# Patient Record
Sex: FEMALE | Race: WHITE | NOT HISPANIC OR LATINO | Employment: FULL TIME
[De-identification: names, ages, dates, MRNs, and addresses within clinical notes are randomized per-mention and may not be internally consistent; named-entity substitution may affect disease eponyms.]

---

## 2021-09-25 ENCOUNTER — HEALTH MAINTENANCE LETTER (OUTPATIENT)
Age: 41
End: 2021-09-25

## 2021-09-29 ENCOUNTER — HOSPITAL ENCOUNTER (OUTPATIENT)
Dept: MAMMOGRAPHY | Facility: CLINIC | Age: 41
Discharge: HOME | End: 2021-09-29
Attending: PHYSICIAN ASSISTANT | Admitting: PHYSICIAN ASSISTANT
Payer: COMMERCIAL

## 2021-09-29 DIAGNOSIS — Z12.31 VISIT FOR SCREENING MAMMOGRAM: ICD-10-CM

## 2021-09-29 PROCEDURE — 77063 BREAST TOMOSYNTHESIS BI: CPT

## 2022-12-26 ENCOUNTER — HEALTH MAINTENANCE LETTER (OUTPATIENT)
Age: 42
End: 2022-12-26

## 2023-03-31 ENCOUNTER — HOSPITAL ENCOUNTER (OUTPATIENT)
Dept: MAMMOGRAPHY | Facility: CLINIC | Age: 43
Discharge: HOME | End: 2023-03-31
Admitting: PHYSICIAN ASSISTANT
Payer: COMMERCIAL

## 2023-03-31 ENCOUNTER — ANCILLARY ORDERS (OUTPATIENT)
Dept: MAMMOGRAPHY | Facility: CLINIC | Age: 43
End: 2023-03-31

## 2023-03-31 DIAGNOSIS — Z12.31 VISIT FOR SCREENING MAMMOGRAM: ICD-10-CM

## 2023-03-31 PROCEDURE — 77067 SCR MAMMO BI INCL CAD: CPT

## 2024-02-04 ENCOUNTER — HEALTH MAINTENANCE LETTER (OUTPATIENT)
Age: 44
End: 2024-02-04

## 2024-11-15 ENCOUNTER — HOSPITAL ENCOUNTER (OUTPATIENT)
Dept: MAMMOGRAPHY | Facility: CLINIC | Age: 44
Discharge: HOME | End: 2024-11-15
Admitting: PHYSICIAN ASSISTANT
Payer: COMMERCIAL

## 2024-11-15 DIAGNOSIS — Z12.31 VISIT FOR SCREENING MAMMOGRAM: ICD-10-CM

## 2024-11-15 PROCEDURE — 77063 BREAST TOMOSYNTHESIS BI: CPT

## 2024-11-20 ENCOUNTER — HOSPITAL ENCOUNTER (OUTPATIENT)
Dept: MAMMOGRAPHY | Facility: CLINIC | Age: 44
Discharge: HOME | End: 2024-11-20
Attending: PHYSICIAN ASSISTANT
Payer: COMMERCIAL

## 2024-11-20 DIAGNOSIS — R92.8 ABNORMAL MAMMOGRAM: ICD-10-CM

## 2024-11-20 PROCEDURE — 77065 DX MAMMO INCL CAD UNI: CPT | Mod: LT

## 2024-11-25 ENCOUNTER — HOSPITAL ENCOUNTER (OUTPATIENT)
Dept: MAMMOGRAPHY | Facility: CLINIC | Age: 44
Discharge: HOME | End: 2024-11-25
Attending: PHYSICIAN ASSISTANT
Payer: COMMERCIAL

## 2024-11-25 DIAGNOSIS — R92.8 ABNORMAL MAMMOGRAM: ICD-10-CM

## 2024-11-25 PROCEDURE — 88360 TUMOR IMMUNOHISTOCHEM/MANUAL: CPT | Mod: TC | Performed by: PHYSICIAN ASSISTANT

## 2024-11-25 PROCEDURE — A4648 IMPLANTABLE TISSUE MARKER: HCPCS

## 2024-11-25 PROCEDURE — 999N000065 MA POST PROCEDURE LEFT

## 2024-11-25 PROCEDURE — 250N000009 HC RX 250: Performed by: RADIOLOGY

## 2024-11-25 PROCEDURE — 250N000011 HC RX IP 250 OP 636: Performed by: RADIOLOGY

## 2024-11-25 PROCEDURE — 272N000715 MA STEREOTACTIC BREAST BIOPSY VACUUM LT

## 2024-11-25 PROCEDURE — 88305 TISSUE EXAM BY PATHOLOGIST: CPT | Mod: TC | Performed by: PHYSICIAN ASSISTANT

## 2024-11-25 RX ADMIN — LIDOCAINE HYDROCHLORIDE 5 ML: 10 INJECTION, SOLUTION INFILTRATION; PERINEURAL at 10:14

## 2024-11-25 RX ADMIN — LIDOCAINE HYDROCHLORIDE 10 ML: 10; .005 INJECTION, SOLUTION EPIDURAL; INFILTRATION; INTRACAUDAL; PERINEURAL at 10:14

## 2024-11-25 NOTE — DISCHARGE INSTRUCTIONS
After Your Breast Biopsy  Bleeding, bruising, and pain  Breast tenderness and some bruising is normal and may last several days. You may wear your bra overnight to support the breast.  You may use an ice pack for pain. Place it over the area for 15 to 20 minutes, several times a day.  You may take over-the-counter pain medicine:  On the day of the biopsy, we recommend Tylenol (acetaminophen) because it does not raise your risk of bleeding.  The next day, you may take an anti-inflammatory medicine (aspirin, ibuprofen, Motrin, Aleve, Advil), unless your doctor tells you not to.  Bandages and showering  Keep your bandage in place until tomorrow morning. Don't get it wet.  If you have small pieces of tape on the skin, leave them in place. They will fall off on their own, or you can remove them after 5 days.  You may shower the next morning after your biopsy.  Activity  No heavy activity (no running, no gym workouts, no lifting, no vacuuming, etc.) on the day of your biopsy.  You may go back to normal activity the next day. But limit what you do if you still have pain or discomfort.  Infection  Infection is rare. Signs of infection include:  Fever (including sweats and chills)  Redness  Pain that gets worse  Fluid draining from the biopsy site  Biopsy results  Results may take up to 5 business days.  A nurse or doctor from the Breast Center will call with your results. We will also send the results to the doctor that ordered your biopsy.  If you have not gotten your results in 5 days, please call the Breast Center.  Call the Breast Center with questions or if:   You have bleeding that lasts more than 20 minutes.  You have pain that you can't control.  You have signs of infection (fever, sweats, chills, redness, increasing pain, or drainage).  After hours, please call the doctor who ordered your biopsy.  For informational purposes only. Not to replace the advice of your health care provider. Copyright   2010 [employer] [facility]. All rights reserved. Clinically reviewed by Stacey Solomon, Director, [facility]. [employer] [id number] - REV 08/23.

## 2024-11-27 LAB
PATH REPORT.COMMENTS IMP SPEC: ABNORMAL
PATH REPORT.COMMENTS IMP SPEC: YES
PATH REPORT.FINAL DX SPEC: ABNORMAL
PATH REPORT.GROSS SPEC: ABNORMAL
PATH REPORT.MICROSCOPIC SPEC OTHER STN: ABNORMAL
PATH REPORT.RELEVANT HX SPEC: ABNORMAL
PATHOLOGY SYNOPTIC REPORT: ABNORMAL
PHOTO IMAGE: ABNORMAL

## 2024-11-27 NOTE — PROGRESS NOTES
Left patient a voicemail following up with her after her recent breast biopsy. Direct number provided. Awaiting return call.     Ania Moore, RN, BSN, PHN, CBCN  Breast Nurse Coordinator  [facility]  [phone]

## 2024-11-27 NOTE — PROGRESS NOTES
"Malignant Path:  Pathology report reviewed with our breast radiologist Dr. Gurpreet Younger, who confirmed the recent breast imaging is concordant with the final surgical pathology results below.    Called and spoke with Misti \"Hermelinda\" regarding the Stereotactic Guided Left Breast Biopsy results showing G3 DCIS ER/ID Negative.     Patient states no problems with biopsy site.    Recommended follow up is Surgical / Oncologic  consultation.    Patient was scheduled at the Multidisciplinary clinic at [facility], on 12/6/2024 with Dr. Hoff [phone] and Dr. Reilly [phone].    Questions were answered and I explained my role as Breast Care Nurse Coordinator in assisting her with appointments, resources and social support.  New diagnosis information packet will be available for patient at surgical consult.  ROSITA Hinojosa will meet with patient the day of her appointments. Patient verbalized understanding and agrees with the plan of care.    Ordering provider- Yamileth Pisano PA-C has been notified of the results, recommendations for follow up, and scheduled surgical consultation.  I will forward this note, along with the pathology results.     Ania Moore RN, BSN, PHN, CBCN  Breast Care Nurse Coordinator  [facility]  [phone]     Final Pathology:  Misti Araujo [id number]  F, 1980  Surgical Pathology Report (Final result) [id number]  Authorizing Provider: Yamileth Pisano PA-C Ordering Provider: Yamileth Pisano PA-C  Ordering Location: [facility]  Collected: 11/25/2024 10:27 AM  Pathologist: Kathe Peck MD Received: 11/25/2024 11:49 AM  .  Specimens  A Breast, Left    Final Diagnosis  A. Left breast with microcalcifications, 10:30, 5 cm from the nipple, 1.5 cm size, intermediate suspicion, stereotactic core  biopsies:  -DUCTAL CARCINOMA IN SITU (DCIS), nuclear grade 3, cribriform type, with calcifications, focal necrosis, and " associated  fibrosis and chronic inflammation (regressive changes).  -No invasive carcinoma identified.  -Size: At least 3 mm in greatest dimension, involving multiple core biopsies.  -Estrogen and Progesterone receptor: Negative (see comment).  -See Breast Biomarker Reporting Template.  Electronically signed by Kathe Peck MD on 11/27/2024 at 12:01 PM

## 2024-12-03 NOTE — TELEPHONE ENCOUNTER
RECORDS STATUS - BREAST    RECORDS REQUESTED FROM: [facility] - Internal records   DATE REQUESTED: 12/3

## 2024-12-06 ENCOUNTER — PRE VISIT (OUTPATIENT)
Dept: ONCOLOGY | Facility: CLINIC | Age: 44
End: 2024-12-06
Payer: COMMERCIAL

## 2024-12-06 ENCOUNTER — OFFICE VISIT (OUTPATIENT)
Dept: ONCOLOGY | Facility: CLINIC | Age: 44
End: 2024-12-06
Attending: PHYSICIAN ASSISTANT
Payer: COMMERCIAL

## 2024-12-06 VITALS
SYSTOLIC BLOOD PRESSURE: 146 MMHG | HEIGHT: 67 IN | DIASTOLIC BLOOD PRESSURE: 99 MMHG | OXYGEN SATURATION: 96 % | HEART RATE: 96 BPM | WEIGHT: 276 LBS | TEMPERATURE: 98.9 F | RESPIRATION RATE: 16 BRPM | BODY MASS INDEX: 43.32 KG/M2

## 2024-12-06 DIAGNOSIS — D05.12 DUCTAL CARCINOMA IN SITU (DCIS) OF LEFT BREAST: Primary | ICD-10-CM

## 2024-12-06 LAB
INTERPRETATION SERPL IEP-IMP: NORMAL
INTERPRETATION SERPL IEP-IMP: NORMAL
LAB PDF RESULT: NORMAL
LAB PDF RESULT: NORMAL
LAB TEST RESULTS REPORTED IN RPTPERIOD: NORMAL
LAB TEST RESULTS REPORTED IN RPTPERIOD: NORMAL
SEQUENCING METHOD PNL BLD/T: NORMAL
SEQUENCING METHOD PNL BLD/T: NORMAL
SPECIMEN TYPE: NORMAL
SPECIMEN TYPE: NORMAL

## 2024-12-06 PROCEDURE — 99205 OFFICE O/P NEW HI 60 MIN: CPT | Performed by: INTERNAL MEDICINE

## 2024-12-06 PROCEDURE — G2211 COMPLEX E/M VISIT ADD ON: HCPCS | Performed by: INTERNAL MEDICINE

## 2024-12-06 ASSESSMENT — PAIN SCALES - GENERAL: PAINLEVEL_OUTOF10: NO PAIN (0)

## 2024-12-06 NOTE — LETTER
"12/6/2024      Misti Araujo  [address]  [address]  [place] [address]      Dear Colleague,    Thank you for referring your patient, Misti Araujo, to the [facility]. Please see a copy of my visit note below.    Oncology Rooming Note    December 6, 2024 8:10 AM   Misti Araujo is a 43 year old female who presents for:    Chief Complaint   Patient presents with     Oncology Clinic Visit     ROOM #1 Complete - AEH  Left breast G3 DCIS ER/IN-        Initial Vitals: BP (!) 146/99 (BP Location: Right leg, Patient Position: Chair, Cuff Size: Adult Large)   Pulse 96   Temp 98.9  F (37.2  C) (Oral)   Resp 16   Ht 1.702 m (5' 7\")   Wt 125.2 kg (276 lb)   SpO2 96%   BMI 43.23 kg/m   Estimated body mass index is 43.23 kg/m  as calculated from the following:    Height as of this encounter: 1.702 m (5' 7\").    Weight as of this encounter: 125.2 kg (276 lb). Body surface area is 2.43 meters squared.  No Pain (0) Comment: Data Unavailable   No LMP recorded.  Allergies reviewed: Yes  Medications reviewed: Yes    Medications: Medication refills not needed today.  Pharmacy name entered into [facility]: [id number] - [place], [place] - [address] AT  [address] [facility]    Clinical concerns: None  Dr Reilly was notified.      Derrell Martinez, [facility]    Breast Patients      1-Do you have any of the following symptoms? Other: N/A  2-In which breast are you having the symptoms? left  3-Have you had a Mammogram? Yes  4-Have you ever had a breast cyst drained? No  5-Have you ever had a breast biopsy? Yes:  Left   -   Date:  11/20/24  6-Have you ever had Breast Cancer? No   7-Is there a history of Breast Cancer in your family? Yes   Relationship to you:    Paternal aunt/maternal aunt  8-Have you ever had Ovarian Cancer? No  9-Is there a history of Ovarian Cancer in your family? No  10-Is there a history of Colon Cancer in your family?  No  11-Is there a history of Uterine " Cancer in your family? Yes, Maternal aunt  12-Any known genetic abnormalities in your family?No  13-Summarize your caffeine intake (i.e. coffee, tea, chocolate, soda etc.): Soda daily      14-What age did your periods begin?  9    15-Date your last menstrual period began?  24  16-Number of full-term pregnancies: N/A   17-Your age when your first child was born? N/A  18-Did you nurse your children? No  19-Are you pregnant now? No  20-Have you begun menopause? No  21-Have you had either ovary removed?No  22-Do you have breast implants? No   23-Have you used hormone replacement therapy?  No  24-Have you taken oral contraceptive pills?  No  25-Have you had an intrauterine device (IUD) placed?  No  26-What is your current bra size?  40DD                 [facility]    Hematology/Oncology New Patient Note      Today's Date: 24    Reason for Consult: Breast cancer    HISTORY OF PRESENT ILLNESS: Misti Araujo is a 43 year old female who presents with with the following oncologic history  1.  Screening mammogram Showed possible calcifications in the left breast at 9 o'clock position anterior depth  2.  Diagnostic mammogram showed an additional 1.5 cm span of amorphous calcification in the left breast at 1030, 5 cm from the nipple  3.  Biopsy confirmed ER negative DCIS  4.    5.  Family history had a paternal aunt with breast cancer and a maternal aunt no family history of ovarian or uterine cancer      REVIEW OF SYSTEMS:   14 point ROS was reviewed and is negative other than as noted above in HPI.       HOME MEDICATIONS:  No current outpatient medications on file.         ALLERGIES:  Allergies   Allergen Reactions     Codeine Nausea and Nausea and Vomiting     Sulfa Antibiotics Hives         PAST MEDICAL HISTORY:  none      PAST SURGICAL HISTORY:  None       SOCIAL HISTORY:  Social History     Socioeconomic History     Marital status: Single     Spouse name: Not on file     Number of  "children: Not on file     Years of education: Not on file     Highest education level: Not on file   Occupational History     Not on file   Tobacco Use     Smoking status: Never     Smokeless tobacco: Never   Substance and Sexual Activity     Alcohol use: Not on file     Drug use: Not on file     Sexual activity: Not on file   Other Topics Concern     Not on file   Social History Narrative     Not on file     Social Drivers of Health     Financial Resource Strain: Not on file   Food Insecurity: Not on file   Transportation Needs: Not on file   Physical Activity: Not on file   Stress: Not on file   Social Connections: Unknown (2022)    Received from [employer] [facility]    Social Connections      Frequency of Communication with Friends and Family: Not on file   Interpersonal Safety: Not on file   Housing Stability: Not on file         FAMILY HISTORY:  No family history on file.      PHYSICAL EXAM:  Vital signs:  BP (!) 146/99 (BP Location: Right leg, Patient Position: Chair, Cuff Size: Adult Large)   Pulse 96   Temp 98.9  F (37.2  C) (Oral)   Resp 16   Ht 1.702 m (5' 7\")   Wt 125.2 kg (276 lb)   SpO2 96%   BMI 43.23 kg/m     ECO  GENERAL/CONSTITUTIONAL: No acute distress.  EYES: Pupils are equal, round, and react to light and accommodation. Extraocular movements intact.  No scleral icterus.  ENT/MOUTH: Neck supple. Oropharynx clear, no mucositis.  LYMPH: No anterior cervical, posterior cervical, supraclavicular, axillary or inguinal adenopathy.   RESPIRATORY: Clear to auscultation bilaterally. No crackles or wheezing.   CARDIOVASCULAR: Regular rate and rhythm without murmurs, gallops, or rubs.  GASTROINTESTINAL: No hepatosplenomegaly, masses, or tenderness. The patient has normal bowel sounds. No guarding.  No distention.  MUSCULOSKELETAL: Warm and well-perfused, no cyanosis, clubbing, or edema.  NEUROLOGIC: Cranial nerves II-XII are intact. Alert, oriented, answers questions " appropriately.  INTEGUMENTARY: No rashes or jaundice.  GAIT: Steady, does not use assistive device  Bilateral breast exam is normal no palpable masses on exam       Labs none      PATHOLOGY:  ER negative DCIS    IMAGING:  Noted     ASSESSMENT/PLAN:  Misti Araujo is a 43 year old female with ER negative DCIS     Per Dr. Quintanilla's recommendation she will get an MRI of the breast to look at the extent of disease.  She needs surgery for curative intent.  We discussed that there is really no role for endocrine therapy in the situation being that she is ER negative DCIS and surgical treatment is the only option for curative intent.    We will plan on seeing her back after surgery.  She has genetic testing ordered.  MRI ordered per Dr. Hoff    1 DCIS ER negative: surgery medical oncology follow-up after the surgery is completed    Total time spent on day of visit, including review of tests, obtaining/reviewing separately obtained history, ordering medications/tests/procedures, communicating with PCP/consultants, and documenting in electronic medical record: 60 minutes                 Bennie Reilly MD  Hematology/Oncology  [facility]       Again, thank you for allowing me to participate in the care of your patient.        Sincerely,        Bennie Reilly MD

## 2024-12-06 NOTE — PROGRESS NOTES
"Oncology Rooming Note    December 6, 2024 8:10 AM   Misti Araujo is a 43 year old female who presents for:    Chief Complaint   Patient presents with    Oncology Clinic Visit     ROOM #1 Complete - AEH  Left breast G3 DCIS ER/MI-        Initial Vitals: BP (!) 146/99 (BP Location: Right leg, Patient Position: Chair, Cuff Size: Adult Large)   Pulse 96   Temp 98.9  F (37.2  C) (Oral)   Resp 16   Ht 1.702 m (5' 7\")   Wt 125.2 kg (276 lb)   SpO2 96%   BMI 43.23 kg/m   Estimated body mass index is 43.23 kg/m  as calculated from the following:    Height as of this encounter: 1.702 m (5' 7\").    Weight as of this encounter: 125.2 kg (276 lb). Body surface area is 2.43 meters squared.  No Pain (0) Comment: Data Unavailable   No LMP recorded.  Allergies reviewed: Yes  Medications reviewed: Yes    Medications: Medication refills not needed today.  Pharmacy name entered into [employer]: [id number][id number] [place], [place] - [address] AT  [address] [facility]    Clinical concerns: None  Dr Reilly was notified.      Derrell Martinez, [facility]    Breast Patients      1-Do you have any of the following symptoms? Other: N/A  2-In which breast are you having the symptoms? left  3-Have you had a Mammogram? Yes  4-Have you ever had a breast cyst drained? No  5-Have you ever had a breast biopsy? Yes:  Left   -   Date:  11/20/24  6-Have you ever had Breast Cancer? No   7-Is there a history of Breast Cancer in your family? Yes   Relationship to you:    Paternal aunt/maternal aunt  8-Have you ever had Ovarian Cancer? No  9-Is there a history of Ovarian Cancer in your family? No  10-Is there a history of Colon Cancer in your family?  No  11-Is there a history of Uterine Cancer in your family? Yes, Maternal aunt  12-Any known genetic abnormalities in your family?No  13-Summarize your caffeine intake (i.e. coffee, tea, chocolate, soda etc.): Soda daily      14-What age did your periods begin?  9    15-Date your last " menstrual period began?  11/9/24  16-Number of full-term pregnancies: N/A   17-Your age when your first child was born? N/A  18-Did you nurse your children? No  19-Are you pregnant now? No  20-Have you begun menopause? No  21-Have you had either ovary removed?No  22-Do you have breast implants? No   23-Have you used hormone replacement therapy?  No  24-Have you taken oral contraceptive pills?  No  25-Have you had an intrauterine device (IUD) placed?  No  26-What is your current bra size?  40DD

## 2024-12-12 NOTE — PROGRESS NOTES
[facility]    Hematology/Oncology New Patient Note      Today's Date: 24    Reason for Consult: Breast cancer    HISTORY OF PRESENT ILLNESS: Misti Araujo is a 43 year old female who presents with with the following oncologic history  1.  Screening mammogram Showed possible calcifications in the left breast at 9 o'clock position anterior depth  2.  Diagnostic mammogram showed an additional 1.5 cm span of amorphous calcification in the left breast at 1030, 5 cm from the nipple  3.  Biopsy confirmed ER negative DCIS  4.    5.  Family history had a paternal aunt with breast cancer and a maternal aunt no family history of ovarian or uterine cancer      REVIEW OF SYSTEMS:   14 point ROS was reviewed and is negative other than as noted above in HPI.       HOME MEDICATIONS:  No current outpatient medications on file.         ALLERGIES:  Allergies   Allergen Reactions    Codeine Nausea and Nausea and Vomiting    Sulfa Antibiotics Hives         PAST MEDICAL HISTORY:  none      PAST SURGICAL HISTORY:  None       SOCIAL HISTORY:  Social History     Socioeconomic History    Marital status: Single     Spouse name: Not on file    Number of children: Not on file    Years of education: Not on file    Highest education level: Not on file   Occupational History    Not on file   Tobacco Use    Smoking status: Never    Smokeless tobacco: Never   Substance and Sexual Activity    Alcohol use: Not on file    Drug use: Not on file    Sexual activity: Not on file   Other Topics Concern    Not on file   Social History Narrative    Not on file     Social Drivers of Health     Financial Resource Strain: Not on file   Food Insecurity: Not on file   Transportation Needs: Not on file   Physical Activity: Not on file   Stress: Not on file   Social Connections: Unknown (2022)    [facility] [employer] [facility]    Social Connections     Frequency of Communication with Friends and  "Family: Not on file   Interpersonal Safety: Not on file   Housing Stability: Not on file         FAMILY HISTORY:  No family history on file.      PHYSICAL EXAM:  Vital signs:  BP (!) 146/99 (BP Location: Right leg, Patient Position: Chair, Cuff Size: Adult Large)   Pulse 96   Temp 98.9  F (37.2  C) (Oral)   Resp 16   Ht 1.702 m (5' 7\")   Wt 125.2 kg (276 lb)   SpO2 96%   BMI 43.23 kg/m     ECO  GENERAL/CONSTITUTIONAL: No acute distress.  EYES: Pupils are equal, round, and react to light and accommodation. Extraocular movements intact.  No scleral icterus.  ENT/MOUTH: Neck supple. Oropharynx clear, no mucositis.  LYMPH: No anterior cervical, posterior cervical, supraclavicular, axillary or inguinal adenopathy.   RESPIRATORY: Clear to auscultation bilaterally. No crackles or wheezing.   CARDIOVASCULAR: Regular rate and rhythm without murmurs, gallops, or rubs.  GASTROINTESTINAL: No hepatosplenomegaly, masses, or tenderness. The patient has normal bowel sounds. No guarding.  No distention.  MUSCULOSKELETAL: Warm and well-perfused, no cyanosis, clubbing, or edema.  NEUROLOGIC: Cranial nerves II-XII are intact. Alert, oriented, answers questions appropriately.  INTEGUMENTARY: No rashes or jaundice.  GAIT: Steady, does not use assistive device  Bilateral breast exam is normal no palpable masses on exam       Labs none      PATHOLOGY:  ER negative DCIS    IMAGING:  Noted     ASSESSMENT/PLAN:  Misti Araujo is a 43 year old female with ER negative DCIS     Per Dr. Quintanilla's recommendation she will get an MRI of the breast to look at the extent of disease.  She needs surgery for curative intent.  We discussed that there is really no role for endocrine therapy in the situation being that she is ER negative DCIS and surgical treatment is the only option for curative intent.    We will plan on seeing her back after surgery.  She has genetic testing ordered.  MRI ordered per Dr. Hoff    1 DCIS ER negative: surgery " medical oncology follow-up after the surgery is completed    Total time spent on day of visit, including review of tests, obtaining/reviewing separately obtained history, ordering medications/tests/procedures, communicating with PCP/consultants, and documenting in electronic medical record: 60 minutes                 Bennie Reilly MD  Hematology/Oncology  [facility]

## 2024-12-20 ENCOUNTER — HOSPITAL ENCOUNTER (OUTPATIENT)
Dept: MRI IMAGING | Facility: HOSPITAL | Age: 44
Discharge: HOME | End: 2024-12-20
Attending: SURGERY | Admitting: SURGERY
Payer: COMMERCIAL

## 2024-12-20 DIAGNOSIS — D05.12 DUCTAL CARCINOMA IN SITU (DCIS) OF LEFT BREAST: ICD-10-CM

## 2024-12-20 PROCEDURE — 77049 MRI BREAST C-+ W/CAD BI: CPT

## 2024-12-20 PROCEDURE — A9585 GADOBUTROL INJECTION: HCPCS | Performed by: SURGERY

## 2024-12-20 PROCEDURE — 255N000002 HC RX 255 OP 636: Performed by: SURGERY

## 2024-12-20 RX ORDER — GADOBUTROL 604.72 MG/ML
0.1 INJECTION INTRAVENOUS ONCE
Status: COMPLETED | OUTPATIENT
Start: 2024-12-20 | End: 2024-12-20

## 2024-12-20 RX ADMIN — GADOBUTROL 13 ML: 604.72 INJECTION INTRAVENOUS at 20:12

## 2024-12-23 ENCOUNTER — TRANSFERRED RECORDS (OUTPATIENT)
Dept: HEALTH INFORMATION MANAGEMENT | Facility: CLINIC | Age: 44
End: 2024-12-23
Payer: COMMERCIAL

## 2024-12-26 ENCOUNTER — LAB (OUTPATIENT)
Dept: LAB | Facility: CLINIC | Age: 44
End: 2024-12-26
Payer: COMMERCIAL

## 2024-12-26 DIAGNOSIS — D05.12 DUCTAL CARCINOMA IN SITU (DCIS) OF LEFT BREAST: Primary | ICD-10-CM

## 2024-12-26 PROCEDURE — G0452 MOLECULAR PATHOLOGY INTERPR: HCPCS | Mod: 26 | Performed by: PATHOLOGY

## 2024-12-26 PROCEDURE — 81162 BRCA1&2 GEN FULL SEQ DUP/DEL: CPT | Performed by: SURGERY

## 2024-12-27 RX ORDER — CETIRIZINE HYDROCHLORIDE 10 MG/1
10 TABLET ORAL DAILY
COMMUNITY

## 2024-12-31 ENCOUNTER — TELEPHONE (OUTPATIENT)
Dept: SURGERY | Facility: CLINIC | Age: 44
End: 2024-12-31
Payer: COMMERCIAL

## 2024-12-31 NOTE — TELEPHONE ENCOUNTER
Breast Care Nurse Coordination:      Preoperative call placed to Hermelinda today to assess her needs, and check in on whether she has any questions or concerns regarding her upcoming breast surgery.    Patient was recently diagnosed with left breast cancer and is scheduled to have a left lumpectomy by Dr. Hoff on 1/3/25 at the [facility].    Hermelinda states she has had a preop physical exam with her on 12/23/24 and has already picked up her sports bras.  I informed patient to bring one of the sports bras with her to the hospital the day of surgery.  We discussed the day of surgery and what to expect the days and weeks following.  I informed patient to wear loose, comfortable fitted clothing.     I answered all of patient's questions completely and thoroughly to her satisfaction.  I informed patient that I will reach out to her next week to check in with her, or she can always call me back with any questions or concerns.  Patient verbalized understanding and agrees with the plan of care.        Micaela Pike, RN, BSN, PHN  Breast Care Nurse Coordinator  [facility]- [facility] [place]

## 2025-01-03 ENCOUNTER — HOSPITAL ENCOUNTER (OUTPATIENT)
Dept: MAMMOGRAPHY | Facility: CLINIC | Age: 45
Discharge: HOME | End: 2025-01-03
Attending: SURGERY
Payer: COMMERCIAL

## 2025-01-03 ENCOUNTER — HOSPITAL ENCOUNTER (OUTPATIENT)
Facility: CLINIC | Age: 45
Discharge: HOME | End: 2025-01-03
Attending: SURGERY | Admitting: SURGERY
Payer: COMMERCIAL

## 2025-01-03 VITALS
WEIGHT: 276.7 LBS | HEIGHT: 67 IN | HEART RATE: 78 BPM | DIASTOLIC BLOOD PRESSURE: 89 MMHG | TEMPERATURE: 97.4 F | SYSTOLIC BLOOD PRESSURE: 125 MMHG | OXYGEN SATURATION: 94 % | RESPIRATION RATE: 12 BRPM | BODY MASS INDEX: 43.43 KG/M2

## 2025-01-03 DIAGNOSIS — D05.12 DUCTAL CARCINOMA IN SITU (DCIS) OF LEFT BREAST: ICD-10-CM

## 2025-01-03 LAB — HCG UR QL: NEGATIVE

## 2025-01-03 PROCEDURE — 81025 URINE PREGNANCY TEST: CPT | Performed by: ANESTHESIOLOGY

## 2025-01-03 PROCEDURE — 19301 PARTIAL MASTECTOMY: CPT | Mod: LT | Performed by: SURGERY

## 2025-01-03 PROCEDURE — 88307 TISSUE EXAM BY PATHOLOGIST: CPT | Mod: TC | Performed by: SURGERY

## 2025-01-03 PROCEDURE — 250N000025 HC SEVOFLURANE, PER MIN: Performed by: SURGERY

## 2025-01-03 PROCEDURE — 272N000001 HC OR GENERAL SUPPLY STERILE: Performed by: SURGERY

## 2025-01-03 PROCEDURE — 370N000017 HC ANESTHESIA TECHNICAL FEE, PER MIN: Performed by: SURGERY

## 2025-01-03 PROCEDURE — 999N000141 HC STATISTIC PRE-PROCEDURE NURSING ASSESSMENT: Performed by: SURGERY

## 2025-01-03 PROCEDURE — 710N000009 HC RECOVERY PHASE 1, LEVEL 1, PER MIN: Performed by: SURGERY

## 2025-01-03 PROCEDURE — 19301 PARTIAL MASTECTOMY: CPT | Mod: AS | Performed by: PHYSICIAN ASSISTANT

## 2025-01-03 PROCEDURE — 360N000082 HC SURGERY LEVEL 2 W/ FLUORO, PER MIN: Performed by: SURGERY

## 2025-01-03 PROCEDURE — 250N000009 HC RX 250: Performed by: ANESTHESIOLOGY

## 2025-01-03 PROCEDURE — 250N000011 HC RX IP 250 OP 636: Performed by: ANESTHESIOLOGY

## 2025-01-03 PROCEDURE — 710N000012 HC RECOVERY PHASE 2, PER MINUTE: Performed by: SURGERY

## 2025-01-03 PROCEDURE — 88342 IMHCHEM/IMCYTCHM 1ST ANTB: CPT | Mod: TC | Performed by: SURGERY

## 2025-01-03 PROCEDURE — 250N000009 HC RX 250: Performed by: SURGERY

## 2025-01-03 PROCEDURE — 250N000011 HC RX IP 250 OP 636: Performed by: SURGERY

## 2025-01-03 RX ORDER — ONDANSETRON 4 MG/1
4 TABLET, ORALLY DISINTEGRATING ORAL EVERY 30 MIN PRN
Status: DISCONTINUED | OUTPATIENT
Start: 2025-01-03 | End: 2025-01-03 | Stop reason: HOSPADM

## 2025-01-03 RX ORDER — OXYCODONE HYDROCHLORIDE 5 MG/1
10 TABLET ORAL
Status: DISCONTINUED | OUTPATIENT
Start: 2025-01-03 | End: 2025-01-03 | Stop reason: HOSPADM

## 2025-01-03 RX ORDER — HYDROCODONE BITARTRATE AND ACETAMINOPHEN 5; 325 MG/1; MG/1
1-2 TABLET ORAL EVERY 4 HOURS PRN
Qty: 10 TABLET | Refills: 0 | Status: SHIPPED | OUTPATIENT
Start: 2025-01-03

## 2025-01-03 RX ORDER — BUPIVACAINE HYDROCHLORIDE 5 MG/ML
INJECTION, SOLUTION PERINEURAL PRN
Status: DISCONTINUED | OUTPATIENT
Start: 2025-01-03 | End: 2025-01-03 | Stop reason: HOSPADM

## 2025-01-03 RX ORDER — SODIUM CHLORIDE, SODIUM LACTATE, POTASSIUM CHLORIDE, CALCIUM CHLORIDE 600; 310; 30; 20 MG/100ML; MG/100ML; MG/100ML; MG/100ML
INJECTION, SOLUTION INTRAVENOUS CONTINUOUS
Status: DISCONTINUED | OUTPATIENT
Start: 2025-01-03 | End: 2025-01-03 | Stop reason: HOSPADM

## 2025-01-03 RX ORDER — SCOPOLAMINE 1 MG/3D
1 PATCH, EXTENDED RELEASE TRANSDERMAL
Status: DISCONTINUED | OUTPATIENT
Start: 2025-01-03 | End: 2025-01-03 | Stop reason: HOSPADM

## 2025-01-03 RX ORDER — FENTANYL CITRATE 0.05 MG/ML
25 INJECTION, SOLUTION INTRAMUSCULAR; INTRAVENOUS EVERY 5 MIN PRN
Status: DISCONTINUED | OUTPATIENT
Start: 2025-01-03 | End: 2025-01-03 | Stop reason: HOSPADM

## 2025-01-03 RX ORDER — OXYCODONE HYDROCHLORIDE 5 MG/1
5 TABLET ORAL
Status: DISCONTINUED | OUTPATIENT
Start: 2025-01-03 | End: 2025-01-03 | Stop reason: HOSPADM

## 2025-01-03 RX ORDER — MAGNESIUM HYDROXIDE 1200 MG/15ML
LIQUID ORAL PRN
Status: DISCONTINUED | OUTPATIENT
Start: 2025-01-03 | End: 2025-01-03 | Stop reason: HOSPADM

## 2025-01-03 RX ORDER — HYDROMORPHONE HCL IN WATER/PF 6 MG/30 ML
0.4 PATIENT CONTROLLED ANALGESIA SYRINGE INTRAVENOUS EVERY 5 MIN PRN
Status: DISCONTINUED | OUTPATIENT
Start: 2025-01-03 | End: 2025-01-03 | Stop reason: HOSPADM

## 2025-01-03 RX ORDER — BUPIVACAINE HYDROCHLORIDE 5 MG/ML
INJECTION, SOLUTION EPIDURAL; INTRACAUDAL
Status: DISCONTINUED
Start: 2025-01-03 | End: 2025-01-03 | Stop reason: HOSPADM

## 2025-01-03 RX ORDER — CEFAZOLIN SODIUM/WATER 3 G/30 ML
3 SYRINGE (ML) INTRAVENOUS
Status: COMPLETED | OUTPATIENT
Start: 2025-01-03 | End: 2025-01-03

## 2025-01-03 RX ORDER — CEFAZOLIN SODIUM/WATER 3 G/30 ML
3 SYRINGE (ML) INTRAVENOUS SEE ADMIN INSTRUCTIONS
Status: DISCONTINUED | OUTPATIENT
Start: 2025-01-03 | End: 2025-01-03 | Stop reason: HOSPADM

## 2025-01-03 RX ORDER — DEXAMETHASONE SODIUM PHOSPHATE 4 MG/ML
4 INJECTION, SOLUTION INTRA-ARTICULAR; INTRALESIONAL; INTRAMUSCULAR; INTRAVENOUS; SOFT TISSUE
Status: DISCONTINUED | OUTPATIENT
Start: 2025-01-03 | End: 2025-01-03 | Stop reason: HOSPADM

## 2025-01-03 RX ORDER — FENTANYL CITRATE 0.05 MG/ML
50 INJECTION, SOLUTION INTRAMUSCULAR; INTRAVENOUS EVERY 5 MIN PRN
Status: DISCONTINUED | OUTPATIENT
Start: 2025-01-03 | End: 2025-01-03 | Stop reason: HOSPADM

## 2025-01-03 RX ORDER — APREPITANT 40 MG/1
40 CAPSULE ORAL ONCE
Status: COMPLETED | OUTPATIENT
Start: 2025-01-03 | End: 2025-01-03

## 2025-01-03 RX ORDER — MEPERIDINE HYDROCHLORIDE 25 MG/ML
12.5 INJECTION INTRAMUSCULAR; INTRAVENOUS; SUBCUTANEOUS EVERY 5 MIN PRN
Status: DISCONTINUED | OUTPATIENT
Start: 2025-01-03 | End: 2025-01-03 | Stop reason: HOSPADM

## 2025-01-03 RX ORDER — HYDROCODONE BITARTRATE AND ACETAMINOPHEN 5; 325 MG/1; MG/1
1-2 TABLET ORAL
Status: DISCONTINUED | OUTPATIENT
Start: 2025-01-03 | End: 2025-01-03 | Stop reason: HOSPADM

## 2025-01-03 RX ORDER — HYDROMORPHONE HCL IN WATER/PF 6 MG/30 ML
0.2 PATIENT CONTROLLED ANALGESIA SYRINGE INTRAVENOUS EVERY 5 MIN PRN
Status: DISCONTINUED | OUTPATIENT
Start: 2025-01-03 | End: 2025-01-03 | Stop reason: HOSPADM

## 2025-01-03 RX ORDER — ONDANSETRON 2 MG/ML
4 INJECTION INTRAMUSCULAR; INTRAVENOUS EVERY 30 MIN PRN
Status: DISCONTINUED | OUTPATIENT
Start: 2025-01-03 | End: 2025-01-03 | Stop reason: HOSPADM

## 2025-01-03 RX ORDER — AMOXICILLIN 250 MG
1-2 CAPSULE ORAL 2 TIMES DAILY PRN
Qty: 15 TABLET | Refills: 0 | Status: SHIPPED | OUTPATIENT
Start: 2025-01-03

## 2025-01-03 RX ORDER — NALOXONE HYDROCHLORIDE 0.4 MG/ML
0.1 INJECTION, SOLUTION INTRAMUSCULAR; INTRAVENOUS; SUBCUTANEOUS
Status: DISCONTINUED | OUTPATIENT
Start: 2025-01-03 | End: 2025-01-03 | Stop reason: HOSPADM

## 2025-01-03 RX ORDER — ONDANSETRON 4 MG/1
4 TABLET, FILM COATED ORAL EVERY 8 HOURS PRN
Qty: 9 TABLET | Refills: 0 | Status: SHIPPED | OUTPATIENT
Start: 2025-01-03

## 2025-01-03 RX ORDER — ACETAMINOPHEN 325 MG/1
975 TABLET ORAL
Status: DISCONTINUED | OUTPATIENT
Start: 2025-01-03 | End: 2025-01-03 | Stop reason: HOSPADM

## 2025-01-03 RX ORDER — FENTANYL CITRATE 0.05 MG/ML
25 INJECTION, SOLUTION INTRAMUSCULAR; INTRAVENOUS
Status: DISCONTINUED | OUTPATIENT
Start: 2025-01-03 | End: 2025-01-03 | Stop reason: HOSPADM

## 2025-01-03 RX ADMIN — APREPITANT 40 MG: 40 CAPSULE ORAL at 13:17

## 2025-01-03 RX ADMIN — SCOLOPAMINE TRANSDERMAL SYSTEM 1 PATCH: 1 PATCH, EXTENDED RELEASE TRANSDERMAL at 13:17

## 2025-01-03 ASSESSMENT — ACTIVITIES OF DAILY LIVING (ADL)
ADLS_ACUITY_SCORE: 41

## 2025-01-03 NOTE — OR NURSING
Patient's prescription for ondansetron(Zofran)called to pharmacist - Sudhir at [facility] on [facility]. Dr Hoff printed prescription and patient had already been discharged. Written prescription ondansetron 4 mg  Sig:Take 1 tablet every 8 hours as needed for nausea.          Dispense  # 9 ( nine). ) refills.

## 2025-01-03 NOTE — DISCHARGE INSTRUCTIONS
[facility]  Discharge Instructions: Post-Operative Breast Surgery    ACTIVITY  Take frequent short walks and increase your activity gradually.    Avoid strenuous physical activity or heavy lifting greater than 15-20 lbs. for 1-2 weeks with arm on the surgery side.  You may climb stairs.  Gentle rotation and stretching of your arms and shoulders will prevent joint stiffness.  You may drive without restrictions when you are not using any prescription pain medication and feel comfortable in a car.  You may return to work/school when you are comfortable without any prescription pain medication.    WOUND CARE  You may remove your ACE wrap/dressing and shower 48 hours after the surgery.  Pat your incisions dry and leave them open to air.  Re-apply dressing (Band-Aids or gauze/tape) as needed for drainage.  You may have steri-strips (looks like white tape) or skin glue on your incisions.  You may peel off the steri-strips 2 weeks after your surgery if they have not peeled off on their own.  If you have skin glue, it will peel up and fall off on its own.  Do not soak your incisions in a tub or pool for 2 weeks.   Do not apply any lotions, creams, or ointments to your incisions.  A ridge under your incisions is normal and will gradually resolve.  Wear a supportive bra for 1-2 weeks, day and night.    DIET  Start with liquids, then gradually resume your regular diet as tolerated.   Drink plenty of liquids to stay hydrated.    PAIN  Expect some tenderness and discomfort at the incision site(s).  Use the prescribed pain medication at your discretion.  Expect gradual resolution of your pain over several days.  You may take ibuprofen with food (unless you have been told not to) or acetaminophen/Tylenol instead of or in addition to your prescribed pain medication.  However, if you are taking Norco, do not take any additional acetaminophen/Tylenol.  Do not drink alcohol or drive while you are taking  pain medications.    EXPECTATIONS  Pain medications can cause constipation.  Limit use when possible.  Take an over the counter or prescribed stool softener/stimulant, such as Colace or Senna, 1-2 times a day with plenty of water.  You may take a mild over the counter laxative, such as Miralax or a suppository, as needed.    You may discontinue these medications once you are having regular bowel movements and/or are no longer taking your narcotic pain medication.    RETURN APPOINTMENT  Follow up with Dr. Hoff in 2-4 weeks for postop check. Please call the office at [phone] to schedule your appointment.  Our [facility]'s name [facility]. The address is [facility] [address], [place], [place], [address]     CALL OUR OFFICE -[phone] IF YOU HAVE:   Chills or fever above 101 F.  Increased redness, warmth, or drainage at your incisions.  Significant bleeding.  Pain not relieved by your pain medication or rest.  Increasing pain after the first 48 hours.  Any other concerns or questions.             Revised October 2022    Same Day Surgery Discharge Instructions for  Sedation and General Anesthesia     It's not unusual to feel dizzy, light-headed or faint for up to 24 hours after surgery or while taking pain medication.  If you have these symptoms: sit for a few minutes before standing and have someone assist you when you get up to walk or use the bathroom.    You should rest and relax for the next 24 hours. We recommend you make arrangements to have an adult stay with you for at least 24 hours after your discharge.  Avoid hazardous and strenuous activity.    DO NOT DRIVE any vehicle or operate mechanical equipment for 24 hours following the end of your surgery.  Even though you may feel normal, your reactions may be affected by the medication you have received.    Do not drink alcoholic beverages for 24 hours following surgery.     Slowly progress to your regular diet as you feel able. It's not unusual  to feel nauseated and/or vomit after receiving anesthesia.  If you develop these symptoms, drink clear liquids (apple juice, ginger ale, broth, 7-up, etc. ) until you feel better.  If your nausea and vomiting persists for 24 hours, please notify your surgeon.      All narcotic pain medications, along with inactivity and anesthesia, can cause constipation. Drinking plenty of liquids and increasing fiber intake will help.    For any questions of a medical nature, call your surgeon.    Do not make important decisions for 24 hours.    If you had general anesthesia, you may have a sore throat for a couple of days related to the breathing tube used during surgery.  You may use Cepacol lozenges to help with this discomfort.  If it worsens or if you develop a fever, contact your surgeon.     If you feel your pain is not well managed with the pain medications prescribed by your surgeon, please contact your surgeon's office to let them know so they can address your concerns.      **If you have concerns or questions about your procedure,    please contact Dr Hoff at  [phone]**

## 2025-01-03 NOTE — BRIEF OP NOTE
[facility]    Brief Operative Note    Pre-operative diagnosis: Ductal carcinoma in situ (DCIS) of left breast [D05.12]  Post-operative diagnosis Same    Procedure: LUMPECTOMY, BREAST, LOCALIZED USING RADIOFREQUENCY IDENTIFICATION, Left - Breast    Surgeon: Surgeons and Role:     * Min Hoff MD - Primary     * Yaz Kramer PA-C - Assisting    Anesthesia: MAC    Estimated Blood Loss: 15 mL from 1/3/2025  1:42 PM to 1/3/2025  3:08 PM      Specimens:   ID Type Source Tests Collected by Time Destination   1 : left breast lumpectomy Tissue Breast, Left SURGICAL PATHOLOGY EXAM Min Hoff MD 1/3/2025  2:24 PM    2 : left breast depp superior medial margin Tissue Breast, Left SURGICAL PATHOLOGY EXAM Min Hoff MD 1/3/2025  2:30 PM      Findings:    As above. No immediate complications. See operative report for full details.    Yaz Kramer PA-C  [facility]  [phone]

## 2025-01-05 NOTE — OP NOTE
General Surgery Operative Note    PREOPERATIVE DIAGNOSIS: Left DCIS    POSTOPERATIVE DIAGNOSIS:  Same    PROCEDURE:   Procedure(s):  Procedure(s):  LUMPECTOMY, BREAST, LOCALIZED USING RADIOFREQUENCY IDENTIFICATION  ONCOPLASTIC CLOSURE    ANESTHESIA:  MAC    PREOPERATIVE MEDICATIONS:  Ancef IV    SURGEON:  Min Hoff MD    ASSISTANT:  Yaz Kramer PA-C  First assistant was necessary due to challenging exposure and the need for improved visualization and help maintaining hemostasis.      INDICATIONS: DCIS, left breast    DESCRIPTION OF PROCEDURE: The patient was taken to the operating suite and given IV sedation.  The left breast was prepped and draped in a sterile fashion. We made a circumareolar incision and directed our dissection toward the seed using the LOCalizer. We used electrocautery to create a specimen and a margin around the RFID tag and lesion. We took this down to the level of the fascia.  We excised out the lump and this was painted using intraoperative ink in the predetermined fashion. Intra-op mammogram was obtained, which demonstrated the tag, clip, and lesion.  The specimen was sent to pathology.  The specimen mammogram did suggest that the calcifications went near the margin, so the deep, superior, and medial margins were reexcised.  We then mobilized the tissue around the lumpectomy, both superficially and at the level of the fascia.  This tissue was then reapproximated with 3-0 Vicryl suture to close the cavity and fill the defect.  We then irrigated out the cavity and closed the skin in layers using 3-0 and 4-0 Vicryl. At the conclusion of the case, all lap and needle counts were correct. Estimated blood loss was 5 cc.      ESTIMATED BLOOD LOSS:  5 mL    INTRAOPERATIVE FINDINGS:  Specimen mammogram demonstrated lesion, tag, and clip.    Min Hoff MD

## 2025-01-09 PROCEDURE — 88307 TISSUE EXAM BY PATHOLOGIST: CPT | Mod: 26 | Performed by: PATHOLOGY

## 2025-01-09 PROCEDURE — 88342 IMHCHEM/IMCYTCHM 1ST ANTB: CPT | Mod: 26 | Performed by: PATHOLOGY

## 2025-01-16 ENCOUNTER — OFFICE VISIT (OUTPATIENT)
Dept: SURGERY | Facility: CLINIC | Age: 45
End: 2025-01-16
Payer: COMMERCIAL

## 2025-01-16 DIAGNOSIS — D05.12 DUCTAL CARCINOMA IN SITU (DCIS) OF LEFT BREAST: Primary | ICD-10-CM

## 2025-01-16 NOTE — LETTER
January 16, 2025          Yamileth Pisano PA-C  [address] [facility] [address]  [place] [address]      RE:   Misti Araujo 1980      Dear Colleague,    Thank you for referring your patient, Misti Araujo, to [facility]. Please see a copy of my visit note below.    Misti Araujo presents today for surgical followup.  she is doing well following left lumpectomy.  Incisions look fine with no signs of wound infection.  Final path revealed 2.5 cm of DCIS, ER-, grade 3.  Closest margin was re-excised at the time of surgery, leaving a 1 mm margin.  We discussed re-excision versus proceeding with radiation and we agree that re-excision would not provide much benefit.   She sees rad/onc 1/29.  I expect her to make a complete recovery.      Again, thank you for allowing me to participate in the care of your patient.      Sincerely,      Min Hoff MD

## 2025-01-16 NOTE — PROGRESS NOTES
Surgery Postop Note    Misti Araujo presents today for surgical followup.  she is doing well following left lumpectomy.  Incisions look fine with no signs of wound infection.  Final path revealed 2.5 cm of DCIS, ER-, grade 3.  Closest margin was re-excised at the time of surgery, leaving a 1 mm margin.  We discussed re-excision versus proceeding with radiation and we agree that re-excision would not provide much benefit.   She sees rad/onc 1/29.  I expect her to make a complete recovery.  Thank you for the opportunity to help in her care.    Delmer Hoff M.D.  [facility]  [phone]    Please route or send letter to:  Primary Care Provider (PCP) and Referring Provider

## 2025-01-29 ENCOUNTER — TRANSFERRED RECORDS (OUTPATIENT)
Dept: HEALTH INFORMATION MANAGEMENT | Facility: CLINIC | Age: 45
End: 2025-01-29

## 2025-01-29 ENCOUNTER — ONCOLOGY VISIT (OUTPATIENT)
Dept: ONCOLOGY | Facility: CLINIC | Age: 45
End: 2025-01-29
Attending: INTERNAL MEDICINE
Payer: COMMERCIAL

## 2025-01-29 VITALS
BODY MASS INDEX: 43.54 KG/M2 | HEART RATE: 88 BPM | SYSTOLIC BLOOD PRESSURE: 153 MMHG | DIASTOLIC BLOOD PRESSURE: 109 MMHG | WEIGHT: 278 LBS | RESPIRATION RATE: 16 BRPM | OXYGEN SATURATION: 99 %

## 2025-01-29 DIAGNOSIS — E66.9 OBESITY, UNSPECIFIED CLASS, UNSPECIFIED OBESITY TYPE, UNSPECIFIED WHETHER SERIOUS COMORBIDITY PRESENT: ICD-10-CM

## 2025-01-29 DIAGNOSIS — G47.00 INSOMNIA, UNSPECIFIED TYPE: ICD-10-CM

## 2025-01-29 DIAGNOSIS — B49 FUNGUS DISEASE: ICD-10-CM

## 2025-01-29 DIAGNOSIS — D05.12 DUCTAL CARCINOMA IN SITU (DCIS) OF LEFT BREAST: Primary | ICD-10-CM

## 2025-01-29 PROCEDURE — 99213 OFFICE O/P EST LOW 20 MIN: CPT | Performed by: INTERNAL MEDICINE

## 2025-01-29 PROCEDURE — 99215 OFFICE O/P EST HI 40 MIN: CPT | Performed by: INTERNAL MEDICINE

## 2025-01-29 PROCEDURE — G2211 COMPLEX E/M VISIT ADD ON: HCPCS | Performed by: INTERNAL MEDICINE

## 2025-01-29 ASSESSMENT — PAIN SCALES - GENERAL: PAINLEVEL_OUTOF10: NO PAIN (0)

## 2025-01-29 NOTE — LETTER
2025      Misti Araujo  [address]  [address]  [facility] [address]      Dear Colleague,    Thank you for referring your patient, Misti Araujo, to the [facility]. Please see a copy of my visit note below.    [facility]    Hematology/Oncology return patient note    Today's Date: 2025    Reason for Consult: Breast cancer    HISTORY OF PRESENT ILLNESS: Misti Araujo is a 43 year old female who presents with with the following oncologic history  1.  Screening mammogram Showed possible calcifications in the left breast at 9 o'clock position anterior depth  2.  Diagnostic mammogram showed an additional 1.5 cm span of amorphous calcification in the left breast at 1030, 5 cm from the nipple  3.  Biopsy confirmed ER negative DCIS  4.    5.  Family history had a paternal aunt with breast cancer and a maternal aunt no family history of ovarian or uterine cancer    Interval history  Hermelinda is doing well. Will see radiation later today. Recovering well. Has toe nail fungus issues. Also would like to work on weight loss. Wants skin check. Has some trouble sleeping at night        REVIEW OF SYSTEMS:   14 point ROS was reviewed and is negative other than as noted above in HPI.     Interval history:   Hermelinda returns for follow up today. She is doing well      HOME MEDICATIONS:  Current Outpatient Medications   Medication Sig Dispense Refill     cetirizine (ZYRTEC) 10 MG tablet Take 10 mg by mouth daily.           ALLERGIES:  Allergies   Allergen Reactions     Codeine Nausea and Nausea and Vomiting     Sulfa Antibiotics Hives         PAST MEDICAL HISTORY:  none      PAST SURGICAL HISTORY:  None       SOCIAL HISTORY:  Social History     Socioeconomic History     Marital status: Single     Spouse name: Not on file     Number of children: Not on file     Years of education: Not on file     Highest education level: Not on file   Occupational History     Not on file    Tobacco Use     Smoking status: Never     Smokeless tobacco: Never   Substance and Sexual Activity     Alcohol use: Yes     Comment: 2-3 drinks per week     Drug use: Not Currently     Sexual activity: Not on file   Other Topics Concern     Not on file   Social History Narrative     Not on file     Social Drivers of Health     Financial Resource Strain: Not on file   Food Insecurity: Not on file   Transportation Needs: Not on file   Physical Activity: Not on file   Stress: Not on file   Social Connections: Unknown (2022)    Received from [facility]    Social Connections      Frequency of Communication with Friends and Family: Not on file   Interpersonal Safety: Low Risk  (1/3/2025)    Interpersonal Safety      Do you feel physically and emotionally safe where you currently live?: Yes      Within the past 12 months, have you been hit, slapped, kicked or otherwise physically hurt by someone?: No      Within the past 12 months, have you been humiliated or emotionally abused in other ways by your partner or ex-partner?: Not on file   Housing Stability: Not on file         FAMILY HISTORY:  No family history on file.      PHYSICAL EXAM:  Vital signs:  BP (!) 153/109   Pulse 88   Resp 16   Wt 126.1 kg (278 lb)   LMP 2024   SpO2 99%   BMI 43.54 kg/m     ECO  Status post left breast lumpectomy healing very well.  Right breast normal    Labs none      PATHOLOGY:  ER negative DCIS      A.  Breast, left, tag-localized partial mastectomy:  -Ductal carcinoma in-situ (DCIS), nuclear grade 3, solid type, with central necrosis, associated calcifications, and regressive changes.  -DCIS is present in microscopic foci spanning five contiguous slices, #1-5, averaging to approximately 25 mm.  -Previous biopsy site identified.  -See synoptic form for additional details and margin status.  -Additional findings: Fibrocystic change, focal florid usual ductal hyperplasia.  -Best tumor  block: A6.     B.  Breast, left, new superior medial margin, reexcision:  -Microscopic focus of residual DCIS, 2 mm in greatest size, at 1 mm from new superior margin (inked red).  -Fibrocystic change, columnar cell change, sclerosing adenosis, pseudoangiomatous stromal hyperplasia (PASH), and usual ductal hyperplasia.  -Microcalcifications associated with benign breast tissue.  -See comment.    IMAGING:  Noted     ASSESSMENT/PLAN:  Misti Araujo is a 43 year old female with ER negative DCIS       Genetic testing negative. Radiation oncology later today. No role for endocrine therapy  See me in 6 months    1 DCIS ER negative: Proceed with radiation therapy.  See me back in 6 months    2 weight management clinic after discussion about her weight    3 candidiasis Toe nail referral to podiartry    4 derm referral per patient request for skin check     5 insomnia: trazadone prn for sleep    Total time spent on day of visit, including review of tests, obtaining/reviewing separately obtained history, ordering medications/tests/procedures, communicating with PCP/consultants, and documenting in electronic medical record: 45 minutes                 Bennie Reilly MD  Hematology/Oncology  [facility]       Again, thank you for allowing me to participate in the care of your patient.        Sincerely,        Bennie Reilly MD    Electronically signed

## 2025-01-29 NOTE — PROGRESS NOTES
[facility]    Hematology/Oncology return patient note    Today's Date: 2025    Reason for Consult: Breast cancer    HISTORY OF PRESENT ILLNESS: Misti Araujo is a 43 year old female who presents with with the following oncologic history  1.  Screening mammogram Showed possible calcifications in the left breast at 9 o'clock position anterior depth  2.  Diagnostic mammogram showed an additional 1.5 cm span of amorphous calcification in the left breast at 1030, 5 cm from the nipple  3.  Biopsy confirmed ER negative DCIS  4.    5.  Family history had a paternal aunt with breast cancer and a maternal aunt no family history of ovarian or uterine cancer    Interval history  Hermelinda is doing well. Will see radiation later today. Recovering well. Has toe nail fungus issues. Also would like to work on weight loss. Wants skin check. Has some trouble sleeping at night        REVIEW OF SYSTEMS:   14 point ROS was reviewed and is negative other than as noted above in HPI.     Interval history:   Hermelinda returns for follow up today. She is doing well      HOME MEDICATIONS:  Current Outpatient Medications   Medication Sig Dispense Refill    cetirizine (ZYRTEC) 10 MG tablet Take 10 mg by mouth daily.           ALLERGIES:  Allergies   Allergen Reactions    Codeine Nausea and Nausea and Vomiting    Sulfa Antibiotics Hives         PAST MEDICAL HISTORY:  none      PAST SURGICAL HISTORY:  None       SOCIAL HISTORY:  Social History     Socioeconomic History    Marital status: Single     Spouse name: Not on file    Number of children: Not on file    Years of education: Not on file    Highest education level: Not on file   Occupational History    Not on file   Tobacco Use    Smoking status: Never    Smokeless tobacco: Never   Substance and Sexual Activity    Alcohol use: Yes     Comment: 2-3 drinks per week    Drug use: Not Currently    Sexual activity: Not on file   Other Topics Concern    Not on file    Social History Narrative    Not on file     Social Drivers of Health     Financial Resource Strain: Not on file   Food Insecurity: Not on file   Transportation Needs: Not on file   Physical Activity: Not on file   Stress: Not on file   Social Connections: Unknown (2022)    Received from [facility]    Social Connections     Frequency of Communication with Friends and Family: Not on file   Interpersonal Safety: Low Risk  (1/3/2025)    Interpersonal Safety     Do you feel physically and emotionally safe where you currently live?: Yes     Within the past 12 months, have you been hit, slapped, kicked or otherwise physically hurt by someone?: No     Within the past 12 months, have you been humiliated or emotionally abused in other ways by your partner or ex-partner?: Not on file   Housing Stability: Not on file         FAMILY HISTORY:  No family history on file.      PHYSICAL EXAM:  Vital signs:  BP (!) 153/109   Pulse 88   Resp 16   Wt 126.1 kg (278 lb)   LMP 2024   SpO2 99%   BMI 43.54 kg/m     ECO  Status post left breast lumpectomy healing very well.  Right breast normal    Labs none      PATHOLOGY:  ER negative DCIS      A.  Breast, left, tag-localized partial mastectomy:  -Ductal carcinoma in-situ (DCIS), nuclear grade 3, solid type, with central necrosis, associated calcifications, and regressive changes.  -DCIS is present in microscopic foci spanning five contiguous slices, #1-5, averaging to approximately 25 mm.  -Previous biopsy site identified.  -See synoptic form for additional details and margin status.  -Additional findings: Fibrocystic change, focal florid usual ductal hyperplasia.  -Best tumor block: A6.     B.  Breast, left, new superior medial margin, reexcision:  -Microscopic focus of residual DCIS, 2 mm in greatest size, at 1 mm from new superior margin (inked red).  -Fibrocystic change, columnar cell change, sclerosing adenosis, pseudoangiomatous  stromal hyperplasia (PASH), and usual ductal hyperplasia.  -Microcalcifications associated with benign breast tissue.  -See comment.    IMAGING:  Noted     ASSESSMENT/PLAN:  Misti Araujo is a 43 year old female with ER negative DCIS       Genetic testing negative. Radiation oncology later today. No role for endocrine therapy  See me in 6 months    1 DCIS ER negative: Proceed with radiation therapy.  See me back in 6 months    2 weight management clinic after discussion about her weight    3 candidiasis Toe nail referral to podiartry    4 derm referral per patient request for skin check     5 insomnia: trazadone prn for sleep    Total time spent on day of visit, including review of tests, obtaining/reviewing separately obtained history, ordering medications/tests/procedures, communicating with PCP/consultants, and documenting in electronic medical record: 45 minutes                 Bennie Reilly MD  Hematology/Oncology  [facility]

## 2025-02-03 RX ORDER — TRAZODONE HYDROCHLORIDE 50 MG/1
50 TABLET ORAL AT BEDTIME
Qty: 90 TABLET | Refills: 3 | Status: SHIPPED | OUTPATIENT
Start: 2025-02-03

## 2025-02-04 ENCOUNTER — PATIENT OUTREACH (OUTPATIENT)
Dept: CARE COORDINATION | Facility: CLINIC | Age: 45
End: 2025-02-04
Payer: COMMERCIAL

## 2025-02-06 ENCOUNTER — PATIENT OUTREACH (OUTPATIENT)
Dept: CARE COORDINATION | Facility: CLINIC | Age: 45
End: 2025-02-06
Payer: COMMERCIAL

## 2025-03-02 ENCOUNTER — HEALTH MAINTENANCE LETTER (OUTPATIENT)
Age: 45
End: 2025-03-02

## 2025-03-11 ENCOUNTER — TRANSFERRED RECORDS (OUTPATIENT)
Dept: HEALTH INFORMATION MANAGEMENT | Facility: CLINIC | Age: 45
End: 2025-03-11
Payer: COMMERCIAL

## 2025-05-28 DIAGNOSIS — D05.12 DUCTAL CARCINOMA IN SITU (DCIS) OF LEFT BREAST: Primary | ICD-10-CM

## 2025-08-07 ENCOUNTER — ONCOLOGY VISIT (OUTPATIENT)
Dept: ONCOLOGY | Facility: CLINIC | Age: 45
End: 2025-08-07
Attending: INTERNAL MEDICINE
Payer: COMMERCIAL

## 2025-08-07 VITALS
SYSTOLIC BLOOD PRESSURE: 137 MMHG | HEART RATE: 83 BPM | BODY MASS INDEX: 43 KG/M2 | OXYGEN SATURATION: 100 % | HEIGHT: 67 IN | DIASTOLIC BLOOD PRESSURE: 91 MMHG | RESPIRATION RATE: 16 BRPM | WEIGHT: 274 LBS

## 2025-08-07 DIAGNOSIS — B49 FUNGUS DISEASE: ICD-10-CM

## 2025-08-07 DIAGNOSIS — D05.12 DUCTAL CARCINOMA IN SITU (DCIS) OF LEFT BREAST: ICD-10-CM

## 2025-08-07 DIAGNOSIS — Z91.89 AT HIGH RISK FOR BREAST CANCER: Primary | ICD-10-CM

## 2025-08-07 DIAGNOSIS — E66.9 OBESITY, UNSPECIFIED CLASS, UNSPECIFIED OBESITY TYPE, UNSPECIFIED WHETHER SERIOUS COMORBIDITY PRESENT: ICD-10-CM

## 2025-08-07 PROCEDURE — 99213 OFFICE O/P EST LOW 20 MIN: CPT | Performed by: INTERNAL MEDICINE

## 2025-08-07 ASSESSMENT — PAIN SCALES - GENERAL: PAINLEVEL_OUTOF10: MILD PAIN (2)

## 2025-08-26 ENCOUNTER — OFFICE VISIT (OUTPATIENT)
Dept: PODIATRY | Facility: CLINIC | Age: 45
End: 2025-08-26
Attending: INTERNAL MEDICINE
Payer: COMMERCIAL

## 2025-08-26 DIAGNOSIS — B35.3 TINEA PEDIS OF BOTH FEET: ICD-10-CM

## 2025-08-26 DIAGNOSIS — B35.1 ONYCHOMYCOSIS: Primary | ICD-10-CM

## 2025-08-26 LAB
ALBUMIN SERPL BCG-MCNC: 4 G/DL (ref 3.5–5.2)
ALP SERPL-CCNC: 68 U/L (ref 40–150)
ALT SERPL W P-5'-P-CCNC: 17 U/L (ref 0–50)
AST SERPL W P-5'-P-CCNC: 23 U/L (ref 0–45)
BILIRUB SERPL-MCNC: 0.3 MG/DL
BILIRUBIN DIRECT (ROCHE PRO & PURE): 0.12 MG/DL (ref 0–0.45)
PROT SERPL-MCNC: 7.5 G/DL (ref 6.4–8.3)

## 2025-08-26 PROCEDURE — 99204 OFFICE O/P NEW MOD 45 MIN: CPT | Performed by: PODIATRIST

## 2025-08-26 PROCEDURE — 80076 HEPATIC FUNCTION PANEL: CPT | Performed by: PODIATRIST

## 2025-08-26 PROCEDURE — 36415 COLL VENOUS BLD VENIPUNCTURE: CPT | Performed by: PODIATRIST

## 2025-08-26 RX ORDER — ECONAZOLE NITRATE 10 MG/G
CREAM TOPICAL DAILY
Qty: 85 G | Refills: 5 | Status: SHIPPED | OUTPATIENT
Start: 2025-08-26

## 2025-08-26 RX ORDER — TERBINAFINE HYDROCHLORIDE 250 MG/1
250 TABLET ORAL DAILY
Qty: 90 TABLET | Refills: 0 | Status: SHIPPED | OUTPATIENT
Start: 2025-08-26 | End: 2025-11-24

## (undated) DEVICE — PACK MINOR SBA15MIFSE

## (undated) DEVICE — DRSG GAUZE 4X4" 3033

## (undated) DEVICE — ESU GROUND PAD UNIVERSAL W/O CORD

## (undated) DEVICE — ESU PENCIL W/SMOKE EVAC NEPTUNE STRYKER 0703-046-000

## (undated) DEVICE — ESU ELEC BLADE 2.75" COATED/INSULATED E1455

## (undated) DEVICE — DRSG STERI STRIP 1/2X4" R1547

## (undated) DEVICE — SOL WATER IRRIG 1000ML BOTTLE 2F7114

## (undated) DEVICE — SUCTION MANIFOLD NEPTUNE 2 SYS 4 PORT 0702-020-000

## (undated) DEVICE — GLOVE BIOGEL PI MICRO SZ 7.5 48575

## (undated) DEVICE — SU VICRYL 3-0 SH 27" J316H

## (undated) DEVICE — GLOVE BIOGEL PI MICRO INDICATOR UNDERGLOVE SZ 7.5 48975

## (undated) DEVICE — SU VICRYL 4-0 PS-2 18" UND J496H

## (undated) DEVICE — VIAL DECANTER STERILE WHITE DYNJDEC06

## (undated) DEVICE — BAG DECANTER STERILE WHITE DYNJDEC09

## (undated) DEVICE — CLIP ETHICON LIGACLIP SM BLUE LT100

## (undated) DEVICE — DRAPE BREAST/CHEST 29420

## (undated) DEVICE — PREP CHLORAPREP 26ML TINTED HI-LITE ORANGE 930815

## (undated) DEVICE — SYR 10ML FINGER CONTROL W/O NDL 309695

## (undated) DEVICE — PAD CHUX UNDERPAD 23X24" 7136

## (undated) DEVICE — SUCTION TIP YANKAUER W/O VENT K86

## (undated) DEVICE — SET BREAST BIOPSY LOCALIZER 20 PROBE 8MM PENCIL 09-0006

## (undated) DEVICE — SUCTION CANISTER MEDIVAC LINER 3000ML W/LID 65651-530

## (undated) DEVICE — MARKER MARGIN MARKER STD 6 COLOR SGL USE MMS6

## (undated) DEVICE — SU SILK 2-0 FSL 18" 677G

## (undated) DEVICE — LINEN TOWEL PACK X5 5464

## (undated) DEVICE — NDL 19GA 1.5"

## (undated) DEVICE — NDL 25GA 1.5" 305127

## (undated) RX ORDER — FENTANYL CITRATE 50 UG/ML
INJECTION, SOLUTION INTRAMUSCULAR; INTRAVENOUS
Status: DISPENSED
Start: 2025-01-03

## (undated) RX ORDER — APREPITANT 40 MG/1
CAPSULE ORAL
Status: DISPENSED
Start: 2025-01-03

## (undated) RX ORDER — CEFAZOLIN SODIUM/WATER 2 G/20 ML
SYRINGE (ML) INTRAVENOUS
Status: DISPENSED
Start: 2025-01-03

## (undated) RX ORDER — SCOPOLAMINE 1 MG/3D
PATCH, EXTENDED RELEASE TRANSDERMAL
Status: DISPENSED
Start: 2025-01-03